# Patient Record
Sex: MALE | Race: WHITE | NOT HISPANIC OR LATINO | Employment: FULL TIME | URBAN - METROPOLITAN AREA
[De-identification: names, ages, dates, MRNs, and addresses within clinical notes are randomized per-mention and may not be internally consistent; named-entity substitution may affect disease eponyms.]

---

## 2019-01-25 ENCOUNTER — OFFICE VISIT CONVERTED (OUTPATIENT)
Dept: FAMILY MEDICINE CLINIC | Facility: CLINIC | Age: 34
End: 2019-01-25
Attending: PHYSICIAN ASSISTANT

## 2019-01-25 ENCOUNTER — HOSPITAL ENCOUNTER (OUTPATIENT)
Dept: FAMILY MEDICINE CLINIC | Facility: CLINIC | Age: 34
Discharge: HOME | End: 2019-01-25
Attending: PHYSICIAN ASSISTANT

## 2019-01-28 ENCOUNTER — OFFICE VISIT CONVERTED (OUTPATIENT)
Dept: SURGERY | Facility: CLINIC | Age: 34
End: 2019-01-28
Attending: SURGERY

## 2019-01-28 ENCOUNTER — HOSPITAL ENCOUNTER (OUTPATIENT)
Dept: LAB | Facility: HOSPITAL | Age: 34
Discharge: HOME | End: 2019-01-28
Attending: PHYSICIAN ASSISTANT

## 2019-01-28 LAB
25(OH)D3 SERPL-MCNC: 27.1 NG/ML (ref 30–100)
ALBUMIN SERPL-MCNC: 4.6 G/DL (ref 3.5–5)
ALBUMIN/GLOB SERPL: 1.7 {RATIO} (ref 1.4–2.6)
ALP SERPL-CCNC: 52 U/L (ref 53–128)
ALT SERPL-CCNC: 77 U/L (ref 10–40)
ANION GAP SERPL CALC-SCNC: 15 MMOL/L (ref 8–19)
AST SERPL-CCNC: 44 U/L (ref 15–50)
BASOPHILS # BLD AUTO: 0.04 10*3/UL (ref 0–0.2)
BASOPHILS NFR BLD AUTO: 0.73 % (ref 0–3)
BILIRUB SERPL-MCNC: 0.86 MG/DL (ref 0.2–1.3)
BUN SERPL-MCNC: 14 MG/DL (ref 5–25)
BUN/CREAT SERPL: 13 {RATIO} (ref 6–20)
CALCIUM SERPL-MCNC: 9.5 MG/DL (ref 8.7–10.4)
CHLORIDE SERPL-SCNC: 105 MMOL/L (ref 99–111)
CHOLEST SERPL-MCNC: 210 MG/DL (ref 107–200)
CHOLEST/HDLC SERPL: 9.5 {RATIO} (ref 3–6)
CONV CO2: 25 MMOL/L (ref 22–32)
CONV TOTAL PROTEIN: 7.3 G/DL (ref 6.3–8.2)
CREAT UR-MCNC: 1.12 MG/DL (ref 0.7–1.2)
EOSINOPHIL # BLD AUTO: 0.2 10*3/UL (ref 0–0.7)
EOSINOPHIL # BLD AUTO: 3.45 % (ref 0–7)
ERYTHROCYTE [DISTWIDTH] IN BLOOD BY AUTOMATED COUNT: 12 % (ref 11.5–14.5)
GFR SERPLBLD BASED ON 1.73 SQ M-ARVRAT: >60 ML/MIN/{1.73_M2}
GLOBULIN UR ELPH-MCNC: 2.7 G/DL (ref 2–3.5)
GLUCOSE SERPL-MCNC: 95 MG/DL (ref 70–99)
HBA1C MFR BLD: 16.2 G/DL (ref 14–18)
HCT VFR BLD AUTO: 45.8 % (ref 42–52)
HDLC SERPL-MCNC: 22 MG/DL (ref 40–60)
LDLC SERPL CALC-MCNC: 140 MG/DL (ref 70–100)
LYMPHOCYTES # BLD AUTO: 2.29 10*3/UL (ref 1–5)
MCH RBC QN AUTO: 31.1 PG (ref 27–31)
MCHC RBC AUTO-ENTMCNC: 35.3 G/DL (ref 33–37)
MCV RBC AUTO: 88.1 FL (ref 80–96)
MONOCYTES # BLD AUTO: 0.7 10*3/UL (ref 0.2–1.2)
MONOCYTES NFR BLD AUTO: 11.9 % (ref 3–10)
NEUTROPHILS # BLD AUTO: 2.66 10*3/UL (ref 2–8)
NEUTROPHILS NFR BLD AUTO: 45.1 % (ref 30–85)
NRBC BLD AUTO-RTO: 0 % (ref 0–0.01)
OSMOLALITY SERPL CALC.SUM OF ELEC: 292 MOSM/KG (ref 273–304)
PLATELET # BLD AUTO: 318 10*3/UL (ref 130–400)
PMV BLD AUTO: 6.4 FL (ref 7.4–10.4)
POTASSIUM SERPL-SCNC: 4 MMOL/L (ref 3.5–5.3)
RBC # BLD AUTO: 5.2 10*6/UL (ref 4.7–6.1)
SODIUM SERPL-SCNC: 141 MMOL/L (ref 135–147)
T4 FREE SERPL-MCNC: 1.4 NG/DL (ref 0.9–1.8)
TRIGL SERPL-MCNC: 240 MG/DL (ref 40–150)
TSH SERPL-ACNC: 1.69 M[IU]/L (ref 0.27–4.2)
VARIANT LYMPHS NFR BLD MANUAL: 38.8 % (ref 20–45)
VLDLC SERPL-MCNC: 48 MG/DL (ref 5–37)
WBC # BLD AUTO: 5.9 10*3/UL (ref 4.8–10.8)

## 2019-01-29 LAB
BACTERIA SPEC AEROBE CULT: ABNORMAL
CONV AZITHROMYCIN SUSCEPTIBILITY BY DISK DIFFUSION (KB): ABNORMAL
CONV CEFOTAXIME (BP): 0.19
CONV CEFTRIAXONE (BP): 0.38
CONV CLINDAMYCIN (KB): ABNORMAL
CONV ERYTHROMYCIN (KB): ABNORMAL
CONV LEVOFLOXACIN SUSCEPTIBILITY BY DISK DIFFUSION (KB): ABNORMAL
CONV PENICILLIN G (BP): 0.03
CONV VANCOMYCIN (KB): ABNORMAL

## 2019-02-04 ENCOUNTER — HOSPITAL ENCOUNTER (OUTPATIENT)
Dept: PERIOP | Facility: HOSPITAL | Age: 34
Setting detail: HOSPITAL OUTPATIENT SURGERY
Discharge: HOME | End: 2019-02-04
Attending: SURGERY

## 2019-02-15 ENCOUNTER — OFFICE VISIT CONVERTED (OUTPATIENT)
Dept: SURGERY | Facility: CLINIC | Age: 34
End: 2019-02-15
Attending: SURGERY

## 2019-02-25 ENCOUNTER — CONVERSION ENCOUNTER (OUTPATIENT)
Dept: SURGERY | Facility: CLINIC | Age: 34
End: 2019-02-25

## 2019-02-25 ENCOUNTER — OFFICE VISIT CONVERTED (OUTPATIENT)
Dept: SURGERY | Facility: CLINIC | Age: 34
End: 2019-02-25
Attending: SURGERY

## 2019-03-18 ENCOUNTER — OFFICE VISIT CONVERTED (OUTPATIENT)
Dept: SURGERY | Facility: CLINIC | Age: 34
End: 2019-03-18
Attending: SURGERY

## 2019-06-04 ENCOUNTER — OFFICE VISIT CONVERTED (OUTPATIENT)
Dept: FAMILY MEDICINE CLINIC | Facility: CLINIC | Age: 34
End: 2019-06-04
Attending: PHYSICIAN ASSISTANT

## 2019-06-06 ENCOUNTER — HOSPITAL ENCOUNTER (OUTPATIENT)
Dept: LAB | Facility: HOSPITAL | Age: 34
Discharge: HOME | End: 2019-06-06
Attending: PHYSICIAN ASSISTANT

## 2019-06-06 LAB
ALBUMIN SERPL-MCNC: 4.6 G/DL (ref 3.5–5)
ALBUMIN/GLOB SERPL: 1.8 {RATIO} (ref 1.4–2.6)
ALP SERPL-CCNC: 59 U/L (ref 53–128)
ALT SERPL-CCNC: 45 U/L (ref 10–40)
AMYLASE SERPL-CCNC: 23 U/L (ref 30–110)
ANION GAP SERPL CALC-SCNC: 16 MMOL/L (ref 8–19)
AST SERPL-CCNC: 23 U/L (ref 15–50)
BASOPHILS # BLD AUTO: 0.08 10*3/UL (ref 0–0.2)
BASOPHILS NFR BLD AUTO: 1.1 % (ref 0–3)
BILIRUB SERPL-MCNC: 0.67 MG/DL (ref 0.2–1.3)
BUN SERPL-MCNC: 15 MG/DL (ref 5–25)
BUN/CREAT SERPL: 15 {RATIO} (ref 6–20)
C DIFF TOX B STL QL CT TISS CULT: POSITIVE
CALCIUM SERPL-MCNC: 9.2 MG/DL (ref 8.7–10.4)
CHLORIDE SERPL-SCNC: 104 MMOL/L (ref 99–111)
CONV 027 TOXIN: NEGATIVE
CONV ABS IMM GRAN: 0.05 10*3/UL (ref 0–0.2)
CONV CO2: 25 MMOL/L (ref 22–32)
CONV IMMATURE GRAN: 0.7 % (ref 0–1.8)
CONV TOTAL PROTEIN: 7.1 G/DL (ref 6.3–8.2)
CREAT UR-MCNC: 0.97 MG/DL (ref 0.7–1.2)
DEPRECATED RDW RBC AUTO: 42 FL (ref 35.1–43.9)
EOSINOPHIL # BLD AUTO: 0.24 10*3/UL (ref 0–0.7)
EOSINOPHIL # BLD AUTO: 3.4 % (ref 0–7)
ERYTHROCYTE [DISTWIDTH] IN BLOOD BY AUTOMATED COUNT: 12.7 % (ref 11.6–14.4)
GFR SERPLBLD BASED ON 1.73 SQ M-ARVRAT: >60 ML/MIN/{1.73_M2}
GLOBULIN UR ELPH-MCNC: 2.5 G/DL (ref 2–3.5)
GLUCOSE SERPL-MCNC: 96 MG/DL (ref 70–99)
HBA1C MFR BLD: 15 G/DL (ref 14–18)
HCT VFR BLD AUTO: 45.9 % (ref 42–52)
LIPASE SERPL-CCNC: 19 U/L (ref 5–51)
LYMPHOCYTES # BLD AUTO: 2.35 10*3/UL (ref 1–5)
MCH RBC QN AUTO: 29.2 PG (ref 27–31)
MCHC RBC AUTO-ENTMCNC: 32.7 G/DL (ref 33–37)
MCV RBC AUTO: 89.3 FL (ref 80–96)
MONOCYTES # BLD AUTO: 0.67 10*3/UL (ref 0.2–1.2)
MONOCYTES NFR BLD AUTO: 9.5 % (ref 3–10)
NEUTROPHILS # BLD AUTO: 3.67 10*3/UL (ref 2–8)
NEUTROPHILS NFR BLD AUTO: 52 % (ref 30–85)
NRBC CBCN: 0 % (ref 0–0.7)
OSMOLALITY SERPL CALC.SUM OF ELEC: 293 MOSM/KG (ref 273–304)
PLATELET # BLD AUTO: 322 10*3/UL (ref 130–400)
PMV BLD AUTO: 9.4 FL (ref 9.4–12.4)
POTASSIUM SERPL-SCNC: 3.7 MMOL/L (ref 3.5–5.3)
RBC # BLD AUTO: 5.14 10*6/UL (ref 4.7–6.1)
SODIUM SERPL-SCNC: 141 MMOL/L (ref 135–147)
VARIANT LYMPHS NFR BLD MANUAL: 33.3 % (ref 20–45)
WBC # BLD AUTO: 7.06 10*3/UL (ref 4.8–10.8)

## 2019-06-08 LAB — BACTERIA SPEC AEROBE CULT: NORMAL

## 2019-07-02 ENCOUNTER — OFFICE VISIT CONVERTED (OUTPATIENT)
Dept: FAMILY MEDICINE CLINIC | Facility: CLINIC | Age: 34
End: 2019-07-02
Attending: PHYSICIAN ASSISTANT

## 2019-07-10 ENCOUNTER — HOSPITAL ENCOUNTER (OUTPATIENT)
Dept: LAB | Facility: HOSPITAL | Age: 34
Discharge: HOME | End: 2019-07-10
Attending: PHYSICIAN ASSISTANT

## 2019-07-10 LAB
C DIFF TOX B STL QL CT TISS CULT: NEGATIVE
CONV 027 TOXIN: NEGATIVE

## 2019-09-17 ENCOUNTER — OFFICE VISIT CONVERTED (OUTPATIENT)
Dept: SURGERY | Facility: CLINIC | Age: 34
End: 2019-09-17
Attending: SURGERY

## 2020-07-31 ENCOUNTER — OFFICE VISIT CONVERTED (OUTPATIENT)
Dept: SURGERY | Facility: CLINIC | Age: 35
End: 2020-07-31
Attending: SURGERY

## 2020-09-04 ENCOUNTER — HOSPITAL ENCOUNTER (OUTPATIENT)
Dept: URGENT CARE | Facility: CLINIC | Age: 35
Discharge: HOME | End: 2020-09-04
Attending: PHYSICIAN ASSISTANT

## 2020-09-06 LAB — SARS-COV-2 RNA SPEC QL NAA+PROBE: NOT DETECTED

## 2021-01-04 ENCOUNTER — TELEMEDICINE CONVERTED (OUTPATIENT)
Dept: FAMILY MEDICINE CLINIC | Facility: CLINIC | Age: 36
End: 2021-01-04
Attending: PHYSICIAN ASSISTANT

## 2021-01-05 ENCOUNTER — HOSPITAL ENCOUNTER (OUTPATIENT)
Dept: FAMILY MEDICINE CLINIC | Facility: CLINIC | Age: 36
Discharge: HOME | End: 2021-01-05
Attending: PHYSICIAN ASSISTANT

## 2021-01-06 LAB — SARS-COV-2 RNA SPEC QL NAA+PROBE: DETECTED

## 2021-05-10 NOTE — PROCEDURES
Procedure Note      Patient Name: Jose Carlos Duarte   Patient ID: [id number]   Sex: Male   YOB: 1985    Primary Care Provider: Henrry Barth PA-C   Referring Provider: Henrry Barth PA-C    Visit Date: July 31, 2020    Provider: Popeye Heath MD   Location[facility]   Location Address: [address] [place] [address]  [id number]   Location Phone: [phone]             Mr. Duarte is seen in follow-up for a history of a pilonidal cyst. He noted swelling over the area of his pilonidal cystectomy, which occurred suddenly.     PHYSICAL EXAMINATION: Examination today reveals this to be consistent with a hematoma.     PROCEDURE NOTE: After Hibiclens prep, 1% Xylocaine was infiltrated and incision was made. Old blood was evacuated. There is no evidence of a cyst.           Assessment  · Recurrent Pilonidal cyst     685.1/L05.91      Plan  · Medications  o Medications have been Reconciled  o Transition of Care or Provider Policy     He will follow-up if this does not resolve.             Electronically Signed by: Sarika Rivera-, -Author on August 5, 2020 09:19:36 AM  Electronically Co-signed by: Popeye Heath MD -Reviewer on August 13, 2020 09:20:05 AM

## 2021-05-14 NOTE — PROGRESS NOTES
Progress Note      Patient Name: Jose Carlos Duarte   Patient ID: [id number]   Sex: Male   YOB: 1985    Primary Care Provider: Henrry Barth PA-C   Referring Provider: Henrry Barth PA-C    Visit Date: January 4, 2021    Provider: Henrry Barth PA-C   Location: [facility]   Location Address: [address] [place] [address], [address]  [place], [place]  [id number]   Location Phone: [phone]          Chief Complaint  · covid exposure  · diarrhea  · no smell  · body aches  · fever      History Of Present Illness  Video Conferencing Visit  Jose Carlos Duarte is a 35 year old /White male who is presenting for evaluation via video conferencing via [employer]. Verbal consent obtained before beginning visit.   The following staff were present during this visit: Tania Johnston CMA/Henrry Barth PA-C   Jose Carlos Duarte is a 35 year old /White male who presents for evaluation and treatment of: covid exposure.      pt presents today for covid exposure.    pt states he had a [employer] gathering on 12/26, on 12/30 his sister and brother in law tested positive for Covid.  pt states on 12/31 he started feeling symptoms of diarrhea, congestion, no smell, body aches and a fever for 1 day of 100.    Last Thurs - he began feeling bad    He thought he had a cold from hunting.    He was with his sister over the holidays and they tested pos    Pt has a fever, chills, body aches, some chest congestion,    Pt uses smokeless tobacco       Past Medical History  Disease Name Date Onset Notes   ***No Significant Medical History --  --    *No Pertinent Past Medical History --  --    Fracture: Metatarsal --  --    Pain: Foot --  --          Past Surgical History  Procedure Name Date Notes   Pilonidal Cyst Excision --  --    Pound Tooth Extraction 2012 --          Allergy List  Allergen Name Date Reaction Notes   NO KNOWN DRUG ALLERGIES --  --  --        Allergies Reconciled  Family Medical  History  Disease Name Relative/Age Notes   *No Known Family History  --          Social History  Finding Status Start/Stop Quantity Notes   Alcohol Current some day 22/-- once a month --    No known infection risk --  --/-- --  --    Single --  --/-- --  --    Smokeless tobacco Current every day 18/-- --  --    Tobacco Never --/-- --  01/25/2019 - pt states he does not smoke, but does chew tobacco          Review of Systems  · Constitutional  o Denies  o : fever, fatigue, weight loss, weight gain  · Cardiovascular  o Denies  o : lower extremity edema, claudication, chest pressure, palpitations  · Respiratory  o Denies  o : shortness of breath, wheezing, cough, hemoptysis, dyspnea on exertion  · Gastrointestinal  o Denies  o : nausea, vomiting, diarrhea, constipation, abdominal pain      Physical Examination  · Constitutional  o Appearance  o : well-nourished, no acute distress  · Head and Face  o Head  o :   § Inspection  § : atraumatic, normocephalic  · Respiratory  o Respiratory Effort  o : breathing unlabored  · Skin and Subcutaneous Tissue  o General Inspection  o : no visible rash, no lesions  · Neurologic  o Mental Status Examination  o :   § Orientation  § : grossly oriented to person, place and time, no facial droop          Assessment  · Diarrhea     787.91/R19.7  · Exposure to COVID-19 virus     V01.79/Z20.828  · Nicotine dependence     305.1/F17.200  Smokeless  · Upper respiratory infection     465.9/J06.9  · Need for influenza vaccination     V04.81/Z23  · Close exposure to COVID-19 virus     V01.79/Z20.828  · Body aches     780.96/R52  · Fever     780.60/R50.9  · Loss of smell     781.1/R43.0      Plan  · Orders  o [facility] NCOV2 (send-out) (25893) - V01.79/Z20.828 - 01/04/2021  o ACO-17: Screened for tobacco use AND received tobacco cessation intervention (4004F) - 305.1/F17.200 - 01/04/2021  o ACO-14: Influenza immunization was not administered for reasons documented () - V04.81/Z23 -  01/04/2021   telehealth  · Medications  o Medications have been Reconciled  o Transition of Care or Provider Policy  · Instructions  o *Form of nicotine being used: smokeless tobacco   o Patient was strongly encouraged to discontinue use of any nicotine containing product or minimize the use of the product.  o Flu vaccine declined.  o Rest. Increase Fluids.  o Patient was educated/instructed on their diagnosis, treatment and medications prior to discharge from the clinic today.  o Discussed Covid-19 precautions including, but not limited to, social distancing, avoid touching your face, and hand washing.   · Disposition  o Call or Return if symptoms worsen or persist.  o Care Transition            Electronically Signed by: Henrry Barth PA-C -Author on January 4, 2021 10:19:37 AM

## 2021-05-15 VITALS — WEIGHT: 208 LBS | BODY MASS INDEX: 29.78 KG/M2 | RESPIRATION RATE: 12 BRPM | HEIGHT: 70 IN

## 2021-05-15 VITALS
HEART RATE: 73 BPM | BODY MASS INDEX: 28.51 KG/M2 | OXYGEN SATURATION: 100 % | DIASTOLIC BLOOD PRESSURE: 76 MMHG | WEIGHT: 199.12 LBS | HEIGHT: 70 IN | SYSTOLIC BLOOD PRESSURE: 127 MMHG

## 2021-05-15 VITALS
OXYGEN SATURATION: 98 % | HEART RATE: 66 BPM | DIASTOLIC BLOOD PRESSURE: 70 MMHG | WEIGHT: 195 LBS | SYSTOLIC BLOOD PRESSURE: 122 MMHG

## 2021-05-15 VITALS
SYSTOLIC BLOOD PRESSURE: 131 MMHG | BODY MASS INDEX: 29.85 KG/M2 | HEART RATE: 87 BPM | DIASTOLIC BLOOD PRESSURE: 82 MMHG | HEIGHT: 70 IN | WEIGHT: 208.5 LBS

## 2021-05-15 VITALS — BODY MASS INDEX: 29.78 KG/M2 | RESPIRATION RATE: 14 BRPM | WEIGHT: 208 LBS | HEIGHT: 70 IN

## 2021-05-15 VITALS — HEIGHT: 70 IN | BODY MASS INDEX: 27.92 KG/M2 | WEIGHT: 195 LBS | RESPIRATION RATE: 14 BRPM

## 2021-05-15 VITALS — WEIGHT: 208 LBS | BODY MASS INDEX: 29.78 KG/M2 | RESPIRATION RATE: 14 BRPM | HEIGHT: 70 IN

## 2021-05-15 VITALS — BODY MASS INDEX: 27.2 KG/M2 | RESPIRATION RATE: 15 BRPM | HEIGHT: 70 IN | WEIGHT: 190 LBS

## 2021-05-15 VITALS — HEIGHT: 70 IN | WEIGHT: 208.31 LBS | RESPIRATION RATE: 16 BRPM | BODY MASS INDEX: 29.82 KG/M2

## 2021-12-02 ENCOUNTER — TELEMEDICINE (OUTPATIENT)
Dept: FAMILY MEDICINE CLINIC | Facility: CLINIC | Age: 36
End: 2021-12-02

## 2021-12-02 ENCOUNTER — TELEPHONE (OUTPATIENT)
Dept: FAMILY MEDICINE CLINIC | Facility: CLINIC | Age: 36
End: 2021-12-02

## 2021-12-02 DIAGNOSIS — J01.90 ACUTE SINUSITIS, RECURRENCE NOT SPECIFIED, UNSPECIFIED LOCATION: Primary | ICD-10-CM

## 2021-12-02 PROBLEM — F17.200 NICOTINE DEPENDENCE: Status: ACTIVE | Noted: 2021-01-04

## 2021-12-02 PROBLEM — M25.579 PAIN IN JOINT, ANKLE AND FOOT: Status: ACTIVE | Noted: 2021-12-02

## 2021-12-02 PROBLEM — S92.309A CLOSED FRACTURE OF METATARSAL BONE: Status: ACTIVE | Noted: 2021-12-02

## 2021-12-02 PROCEDURE — 99213 OFFICE O/P EST LOW 20 MIN: CPT | Performed by: NURSE PRACTITIONER

## 2021-12-02 RX ORDER — AMOXICILLIN AND CLAVULANATE POTASSIUM 875; 125 MG/1; MG/1
1 TABLET, FILM COATED ORAL 2 TIMES DAILY
Qty: 20 TABLET | Refills: 0 | Status: SHIPPED | OUTPATIENT
Start: 2021-12-02 | End: 2021-12-12

## 2021-12-02 NOTE — PROGRESS NOTES
You have chosen to receive care through a telehealth visit.  Do you consent to use a video/audio connection for your medical care today? Yes  Chief Complaint  Sore Throat    Subjective          Jose Carlos Duarte presents to [facility]  History of Present Illness pt presents today for sore throat   Pt has green mucus   Pt had a fever of 99.0 last night   Denies chills, body aches, loss of taste or smell, SOA, cough or chest congestion. Admits sinus pain/pressure, ear pain. Prescribed Augmentin bid x 10 days.     Labs 6/6/19    He  has no past medical history on file.     Objective   Vital Signs:   There were no vitals taken for this visit.    Physical Exam  Constitutional:       Appearance: Normal appearance. He is ill-appearing.   Neurological:      General: No focal deficit present.      Mental Status: He is alert and oriented to person, place, and time.   Psychiatric:         Mood and Affect: Mood normal.         Behavior: Behavior normal.        Result Review :            History reviewed. No pertinent surgical history.   History reviewed. No pertinent family history.     Current Outpatient Medications:   •  amoxicillin-clavulanate (Augmentin) 875-125 MG per tablet, Take 1 tablet by mouth 2 (Two) Times a Day for 10 days., Disp: 20 tablet, Rfl: 0   Assessment and Plan    Diagnoses and all orders for this visit:    1. Acute sinusitis, recurrence not specified, unspecified location (Primary)  -     amoxicillin-clavulanate (Augmentin) 875-125 MG per tablet; Take 1 tablet by mouth 2 (Two) Times a Day for 10 days.  Dispense: 20 tablet; Refill: 0        Follow Up   Return if symptoms worsen or fail to improve.  Patient was given instructions and counseling regarding his condition or for health maintenance advice. Please see specific information pulled into the AVS if appropriate.     Jose Carlos Duarte  has no history on file for tobacco use..            July Mike,  APRN

## 2025-03-11 ENCOUNTER — PATIENT ROUNDING (BHMG ONLY) (OUTPATIENT)
Dept: URGENT CARE | Facility: CLINIC | Age: 40
End: 2025-03-11
Payer: COMMERCIAL

## 2025-03-11 NOTE — ED NOTES
Thank you for letting us care for you in your recent visit to our urgent care center. We would love to hear about your experience with us. Was this the first time you have visited our location?    We’re always looking for ways to make our patients’ experiences even better. Do you have any recommendations on ways we may improve?     I appreciate you taking the time to respond. Please be on the lookout for a survey about your recent visit from [employer] via text or email. We would greatly appreciate if you could fill that out and turn it back in. We want your voice to be heard and we value your feedback.   Thank you for choosing [facility] for your healthcare needs.